# Patient Record
Sex: MALE | Race: WHITE | NOT HISPANIC OR LATINO | URBAN - METROPOLITAN AREA
[De-identification: names, ages, dates, MRNs, and addresses within clinical notes are randomized per-mention and may not be internally consistent; named-entity substitution may affect disease eponyms.]

---

## 2021-01-27 ENCOUNTER — OFFICE (OUTPATIENT)
Dept: URBAN - METROPOLITAN AREA CLINIC 11 | Facility: CLINIC | Age: 26
End: 2021-01-27

## 2021-01-27 VITALS
WEIGHT: 218 LBS | HEART RATE: 72 BPM | OXYGEN SATURATION: 99 % | SYSTOLIC BLOOD PRESSURE: 127 MMHG | HEIGHT: 67 IN | DIASTOLIC BLOOD PRESSURE: 87 MMHG

## 2021-01-27 DIAGNOSIS — R10.13 EPIGASTRIC PAIN: ICD-10-CM

## 2021-01-27 PROCEDURE — 99204 OFFICE O/P NEW MOD 45 MIN: CPT

## 2021-02-02 ENCOUNTER — AMBULATORY SURGICAL CENTER (OUTPATIENT)
Dept: URBAN - METROPOLITAN AREA SURGERY 3 | Facility: SURGERY | Age: 26
End: 2021-02-02

## 2021-02-02 ENCOUNTER — OFFICE (OUTPATIENT)
Dept: URBAN - METROPOLITAN AREA PATHOLOGY 22 | Facility: PATHOLOGY | Age: 26
End: 2021-02-02

## 2021-02-02 VITALS
HEART RATE: 84 BPM | OXYGEN SATURATION: 98 % | RESPIRATION RATE: 20 BRPM | SYSTOLIC BLOOD PRESSURE: 130 MMHG | DIASTOLIC BLOOD PRESSURE: 78 MMHG | DIASTOLIC BLOOD PRESSURE: 71 MMHG | RESPIRATION RATE: 19 BRPM | HEIGHT: 67 IN | RESPIRATION RATE: 20 BRPM | WEIGHT: 218 LBS | HEIGHT: 67 IN | RESPIRATION RATE: 18 BRPM | RESPIRATION RATE: 20 BRPM | OXYGEN SATURATION: 95 % | RESPIRATION RATE: 19 BRPM | DIASTOLIC BLOOD PRESSURE: 81 MMHG | HEART RATE: 72 BPM | HEART RATE: 76 BPM | DIASTOLIC BLOOD PRESSURE: 81 MMHG | OXYGEN SATURATION: 95 % | TEMPERATURE: 97.2 F | RESPIRATION RATE: 19 BRPM | DIASTOLIC BLOOD PRESSURE: 78 MMHG | RESPIRATION RATE: 18 BRPM | HEART RATE: 80 BPM | DIASTOLIC BLOOD PRESSURE: 77 MMHG | TEMPERATURE: 97 F | SYSTOLIC BLOOD PRESSURE: 124 MMHG | SYSTOLIC BLOOD PRESSURE: 127 MMHG | HEART RATE: 80 BPM | WEIGHT: 218 LBS | HEART RATE: 76 BPM | HEART RATE: 76 BPM | OXYGEN SATURATION: 95 % | OXYGEN SATURATION: 96 % | SYSTOLIC BLOOD PRESSURE: 130 MMHG | DIASTOLIC BLOOD PRESSURE: 78 MMHG | DIASTOLIC BLOOD PRESSURE: 76 MMHG | SYSTOLIC BLOOD PRESSURE: 124 MMHG | RESPIRATION RATE: 21 BRPM | TEMPERATURE: 97.2 F | TEMPERATURE: 97.2 F | TEMPERATURE: 97 F | RESPIRATION RATE: 18 BRPM | SYSTOLIC BLOOD PRESSURE: 127 MMHG | WEIGHT: 218 LBS | DIASTOLIC BLOOD PRESSURE: 76 MMHG | SYSTOLIC BLOOD PRESSURE: 138 MMHG | RESPIRATION RATE: 21 BRPM | HEART RATE: 84 BPM | SYSTOLIC BLOOD PRESSURE: 138 MMHG | SYSTOLIC BLOOD PRESSURE: 130 MMHG | OXYGEN SATURATION: 98 % | DIASTOLIC BLOOD PRESSURE: 77 MMHG | TEMPERATURE: 97 F | DIASTOLIC BLOOD PRESSURE: 77 MMHG | OXYGEN SATURATION: 96 % | SYSTOLIC BLOOD PRESSURE: 127 MMHG | HEART RATE: 72 BPM | DIASTOLIC BLOOD PRESSURE: 71 MMHG | HEIGHT: 67 IN | SYSTOLIC BLOOD PRESSURE: 138 MMHG | DIASTOLIC BLOOD PRESSURE: 76 MMHG | OXYGEN SATURATION: 96 % | RESPIRATION RATE: 21 BRPM | OXYGEN SATURATION: 98 % | HEART RATE: 84 BPM | HEART RATE: 72 BPM | DIASTOLIC BLOOD PRESSURE: 71 MMHG | HEART RATE: 80 BPM | DIASTOLIC BLOOD PRESSURE: 81 MMHG | SYSTOLIC BLOOD PRESSURE: 124 MMHG

## 2021-02-02 DIAGNOSIS — K22.10 ULCER OF ESOPHAGUS WITHOUT BLEEDING: ICD-10-CM

## 2021-02-02 DIAGNOSIS — K29.50 UNSPECIFIED CHRONIC GASTRITIS WITHOUT BLEEDING: ICD-10-CM

## 2021-02-02 DIAGNOSIS — K20.90 ESOPHAGITIS, UNSPECIFIED WITHOUT BLEEDING: ICD-10-CM

## 2021-02-02 PROBLEM — K22.8 OTHER SPECIFIED DISEASES OF ESOPHAGUS: Status: ACTIVE | Noted: 2021-02-02

## 2021-02-02 PROCEDURE — 88305 TISSUE EXAM BY PATHOLOGIST: CPT

## 2021-02-02 PROCEDURE — 88342 IMHCHEM/IMCYTCHM 1ST ANTB: CPT | Mod: 59

## 2021-02-02 PROCEDURE — 88313 SPECIAL STAINS GROUP 2: CPT

## 2021-02-02 PROCEDURE — 88341 IMHCHEM/IMCYTCHM EA ADD ANTB: CPT

## 2021-02-02 PROCEDURE — 43239 EGD BIOPSY SINGLE/MULTIPLE: CPT

## 2021-02-02 PROCEDURE — 88312 SPECIAL STAINS GROUP 1: CPT

## 2021-02-02 NOTE — SERVICEHPINOTES
Mr. Larios has an 8 month history of increasingly frequent episodes of emesis. Initially had vomiting about once a week. He is now vomiting 3-5 days per week. This can occur with liquids or solids any time within the 1st 2 hours of eating. He occasionally has nausea associated with these episodes.Hunter does admit to a burning retrosternal chest pain.His symptoms are most consistent with acid reflux possibility of mechanical outlet problems secondary to ulcer disease or gastroparesis.

## 2021-08-24 ENCOUNTER — OFFICE (OUTPATIENT)
Dept: URBAN - METROPOLITAN AREA CLINIC 19 | Facility: CLINIC | Age: 26
End: 2021-08-24

## 2021-08-24 VITALS
SYSTOLIC BLOOD PRESSURE: 125 MMHG | WEIGHT: 225 LBS | OXYGEN SATURATION: 98 % | HEIGHT: 67 IN | HEART RATE: 90 BPM | DIASTOLIC BLOOD PRESSURE: 91 MMHG

## 2021-08-24 DIAGNOSIS — F31.9 BIPOLAR DISORDER, UNSPECIFIED: ICD-10-CM

## 2021-08-24 DIAGNOSIS — F42.9 OBSESSIVE-COMPULSIVE DISORDER, UNSPECIFIED: ICD-10-CM

## 2021-08-24 DIAGNOSIS — F84.5 ASPERGER'S SYNDROME: ICD-10-CM

## 2021-08-24 DIAGNOSIS — K21.9 GASTRO-ESOPHAGEAL REFLUX DISEASE WITHOUT ESOPHAGITIS: ICD-10-CM

## 2021-08-24 PROCEDURE — 99214 OFFICE O/P EST MOD 30 MIN: CPT | Performed by: INTERNAL MEDICINE

## 2021-11-09 ENCOUNTER — OFFICE (OUTPATIENT)
Dept: URBAN - METROPOLITAN AREA CLINIC 19 | Facility: CLINIC | Age: 26
End: 2021-11-09

## 2021-11-09 VITALS
SYSTOLIC BLOOD PRESSURE: 134 MMHG | WEIGHT: 230 LBS | OXYGEN SATURATION: 99 % | HEIGHT: 67 IN | DIASTOLIC BLOOD PRESSURE: 88 MMHG | HEART RATE: 82 BPM

## 2021-11-09 DIAGNOSIS — F84.5 ASPERGER'S SYNDROME: ICD-10-CM

## 2021-11-09 DIAGNOSIS — K21.9 GASTRO-ESOPHAGEAL REFLUX DISEASE WITHOUT ESOPHAGITIS: ICD-10-CM

## 2021-11-09 DIAGNOSIS — F42.9 OBSESSIVE-COMPULSIVE DISORDER, UNSPECIFIED: ICD-10-CM

## 2021-11-09 DIAGNOSIS — F31.9 BIPOLAR DISORDER, UNSPECIFIED: ICD-10-CM

## 2021-11-09 PROCEDURE — 99214 OFFICE O/P EST MOD 30 MIN: CPT

## 2021-11-09 RX ORDER — FAMOTIDINE 40 MG/1
40 TABLET, FILM COATED ORAL
Qty: 30 | Refills: 11 | Status: ACTIVE
Start: 2021-11-09

## 2021-11-09 RX ORDER — ONDANSETRON HYDROCHLORIDE 8 MG/1
24 TABLET, FILM COATED ORAL
Qty: 90 | Refills: 3 | Status: ACTIVE
Start: 2021-11-09

## 2021-11-09 NOTE — SERVICEHPINOTES
25-year-old  white male with multiple psychiatric issues (Asperger's, bipolar and OCD) returns with his wife for persistent nausea vomiting.
vania Villar was last here for the same symptoms to see Dr. Marcelino on 8/24/21.  He had previously been seen by Dr. Azar  he did an EGD on 2/2/21 which found reflux esophagitis.  Biopsies were negative for Hankins's esophagus and H pylori.  Has nausea continued to persist on the Prilosec 40 milligrams daily.  Dr. Marcelino ordered an AUS and HIDA scan which on 10/6/21 were unremarkable.  He then ordered a nuclear medicine gastric emptying study on 11/3/21, but unfortunately the patient was unable to complete this test due to his severe nausea vomiting.  He continues to have episodes of nausea vomiting but seem to occur after he eats.  He denies any unintentional weight loss or loss of appetite.  He is currently not on any antinausea medication.  He does take omeprazole 40 milligrams daily, but denies this gives him incomplete relief.  He often supplements with Tums.  He denies NSAID use.  He denies dysphagia, abdominal pain, change in bowel habit or overt GI bleeding.vania
vania He sees a psychiatrist in [place], [place] via telehealth.  Family history is negative for colon neoplasm.

## 2021-11-09 NOTE — SERVICENOTES
The patient's assessment was reviewed with Dr. Marcelino and a collaborative plan of care was established.

## 2022-01-12 ENCOUNTER — OFFICE (OUTPATIENT)
Dept: URBAN - METROPOLITAN AREA CLINIC 19 | Facility: CLINIC | Age: 27
End: 2022-01-12

## 2022-01-12 VITALS
HEIGHT: 67 IN | DIASTOLIC BLOOD PRESSURE: 93 MMHG | SYSTOLIC BLOOD PRESSURE: 132 MMHG | WEIGHT: 225 LBS | OXYGEN SATURATION: 98 % | HEART RATE: 74 BPM

## 2022-01-12 DIAGNOSIS — K82.8 OTHER SPECIFIED DISEASES OF GALLBLADDER: ICD-10-CM

## 2022-01-12 DIAGNOSIS — K21.9 GASTRO-ESOPHAGEAL REFLUX DISEASE WITHOUT ESOPHAGITIS: ICD-10-CM

## 2022-01-12 PROCEDURE — 99213 OFFICE O/P EST LOW 20 MIN: CPT

## 2022-01-12 RX ORDER — PROMETHAZINE HYDROCHLORIDE 25 MG/1
100 TABLET ORAL
Qty: 90 | Refills: 3 | Status: ACTIVE
Start: 2022-01-12

## 2022-01-12 RX ORDER — OMEPRAZOLE 40 MG/1
40 CAPSULE, DELAYED RELEASE ORAL
Qty: 30 | Refills: 11 | Status: ACTIVE
Start: 2021-02-08

## 2022-01-12 RX ORDER — ONDANSETRON HYDROCHLORIDE 8 MG/1
24 TABLET, FILM COATED ORAL
Qty: 90 | Refills: 3 | Status: ACTIVE
Start: 2021-11-09

## 2022-01-12 NOTE — SERVICEHPINOTES
26-year-old  white male with multiple psychiatric issues (Asperger's, bipolar and OCD) returns for routine follow-up of his persistent intermittent nausea and vomiting.vania caro He was here for these same symptoms to see Dr. Marcelino on 8/24/21.  He had previously been seen by Dr. Azar  who did an EGD on 2/2/21 which found reflux esophagitis.  Biopsies were negative for Hankins's esophagus and H pylori.  His nausea continued to persist on the Prilosec 40 milligrams daily.  Dr. Marcelino ordered an AUS and HIDA scan which on 10/6/21 was remarkable for diminished gallbladder function (GBEF=24%).  He then ordered a nuclear medicine gastric emptying study on 11/3/21, but unfortunately the patient was unable to complete this test due to his severe vomiting.  He continued to have episodes of vomiting. I saw him in follow-up on 11/9/21 and denied any unintentional weight loss or loss of appetite.  He was  not on any antinausea medication at that time.  He was on omeprazole 40 milligrams daily, but denied this giving him much relief.   I added famotidine 40 mg to take at bedtime as well as ondansetron 8 mg TID as needed for nausea.  We discussed trying to repeat the nuclear medicine gastric emptying study, but he did not think he was able to do this test without vomiting.
vania caro   Since starting the famotidine at bedtime, his reflux has significantly improved.  Interestingly, he has been taking the Zofran 3 times daily on a scheduled basis.  His vomiting has improved a little, but he still has episodes every other day or more.  Again, he denies any unintentional weight loss.  He has changed his diet as well and has eliminated all fried foods which also seems to help.  He denies dysphagia, abdominal pain, change in bowel habit or overt GI bleeding.He sees a psychiatrist in [place], [place] via telehealth.  Family history is negative for colon neoplasm.

## 2022-05-26 ENCOUNTER — OFFICE (OUTPATIENT)
Dept: URBAN - METROPOLITAN AREA CLINIC 19 | Facility: CLINIC | Age: 27
End: 2022-05-26

## 2022-05-26 VITALS
SYSTOLIC BLOOD PRESSURE: 149 MMHG | OXYGEN SATURATION: 98 % | WEIGHT: 221 LBS | DIASTOLIC BLOOD PRESSURE: 85 MMHG | HEART RATE: 91 BPM | HEIGHT: 67 IN

## 2022-05-26 DIAGNOSIS — F84.5 ASPERGER'S SYNDROME: ICD-10-CM

## 2022-05-26 DIAGNOSIS — K82.8 OTHER SPECIFIED DISEASES OF GALLBLADDER: ICD-10-CM

## 2022-05-26 DIAGNOSIS — K21.9 GASTRO-ESOPHAGEAL REFLUX DISEASE WITHOUT ESOPHAGITIS: ICD-10-CM

## 2022-05-26 DIAGNOSIS — F31.9 BIPOLAR DISORDER, UNSPECIFIED: ICD-10-CM

## 2022-05-26 PROCEDURE — 99213 OFFICE O/P EST LOW 20 MIN: CPT

## 2022-05-26 RX ORDER — PROMETHAZINE HYDROCHLORIDE 25 MG/1
100 TABLET ORAL
Qty: 90 | Refills: 3 | Status: ACTIVE
Start: 2022-01-12

## 2022-05-26 NOTE — SERVICEHPINOTES
26-year-old  white male with multiple psychiatric issues (Asperger's, bipolar and OCD) returns for routine follow-up of his persistent intermittent nausea and vomiting. He was here for these same symptoms to see Dr. Marcelino on 8/24/21.  He had previously been seen by Dr. Azar  who did an EGD on 2/2/21 which found reflux esophagitis.  Biopsies were negative for Hankins's esophagus and H pylori.  His nausea continued to persist on the Prilosec 40 milligrams daily.  Dr. Marcelino ordered an AUS and HIDA scan which on 10/6/21 was remarkable for diminished gallbladder function (GBEF=24%).  He then ordered a nuclear medicine gastric emptying study on 11/3/21, but unfortunately the patient was unable to complete this test due to his severe vomiting.  He continued to have episodes of vomiting. I saw him in follow-up on 11/9/21 and he denied any unintentional weight loss or loss of appetite.  He was  not on any antinausea medication at that time.  He was on omeprazole 40 milligrams daily, but denied this giving him much relief.   I added famotidine 40 mg to take at bedtime as well as ondansetron 8 mg TID as needed for nausea.  We discussed trying to repeat the nuclear medicine gastric emptying study, but he did not think he was able to do this test without vomiting.  He returned 1/12/22 for persistent symptoms.  I added promethazine 12.5 mg to take as needed.  We decided not to do any additional tests.  
vania Villar returns today for routine follow-up.  His reflux is well managed on omeprazole 40 mg daily as well as famotidine at night.  He does continue to have intermittent episodes of nausea and vomiting.  He takes Zofran twice a day usually as well as Phenergan twice a day.  He continues to have intermittent episodes of the nausea and vomiting and typically it is after he eats a heavier meal.  His symptoms have improved since starting the Phenergan, but he is wondering if and increase dose might help his symptoms even more.  He denies dysphagia, abdominal pain or overt GI bleeding.He sees a psychiatrist in [place], [place] via telehealth.  Family history is negative for colon neoplasm.

## 2025-07-30 NOTE — SERVICENOTES
Based on the patients symptoms and/or clinical findings the procedure is clinically indicated and should not be delayed due to Covid 19. [Alert] : alert [No Acute Distress] : no acute distress [Normocephalic] : normocephalic [EOMI Bilateral] : EOMI bilateral [Clear tympanic membranes with bony landmarks and light reflex present bilaterally] : clear tympanic membranes with bony landmarks and light reflex present bilaterally  [Pink Nasal Mucosa] : pink nasal mucosa [Nonerythematous Oropharynx] : nonerythematous oropharynx [Supple, full passive range of motion] : supple, full passive range of motion [No Palpable Masses] : no palpable masses [Clear to Auscultation Bilaterally] : clear to auscultation bilaterally [Regular Rate and Rhythm] : regular rate and rhythm [Normal S1, S2 audible] : normal S1, S2 audible [No Murmurs] : no murmurs [+2 Femoral Pulses] : +2 femoral pulses [Soft] : soft [NonTender] : non tender [Non Distended] : non distended [Normoactive Bowel Sounds] : normoactive bowel sounds [No Hepatomegaly] : no hepatomegaly [No Splenomegaly] : no splenomegaly [No Abnormal Lymph Nodes Palpated] : no abnormal lymph nodes palpated [Normal Muscle Tone] : normal muscle tone [No Gait Asymmetry] : no gait asymmetry [No pain or deformities with palpation of bone, muscles, joints] : no pain or deformities with palpation of bone, muscles, joints [Straight] : straight [+2 Patella DTR] : +2 patella DTR [Cranial Nerves Grossly Intact] : cranial nerves grossly intact [No Rash or Lesions] : no rash or lesions [Car: _____] : Car [unfilled]